# Patient Record
Sex: MALE | Race: WHITE | NOT HISPANIC OR LATINO | Employment: FULL TIME | URBAN - METROPOLITAN AREA
[De-identification: names, ages, dates, MRNs, and addresses within clinical notes are randomized per-mention and may not be internally consistent; named-entity substitution may affect disease eponyms.]

---

## 2021-05-07 ENCOUNTER — HOSPITAL ENCOUNTER (OUTPATIENT)
Dept: URGENT CARE | Facility: CLINIC | Age: 41
Discharge: HOME | End: 2021-05-07
Attending: PHYSICIAN ASSISTANT

## 2023-01-09 ENCOUNTER — HOSPITAL ENCOUNTER (EMERGENCY)
Facility: HOSPITAL | Age: 43
Discharge: HOME | End: 2023-01-09
Attending: EMERGENCY MEDICINE | Admitting: EMERGENCY MEDICINE
Payer: COMMERCIAL

## 2023-01-09 ENCOUNTER — TELEPHONE (OUTPATIENT)
Dept: GASTROENTEROLOGY | Facility: CLINIC | Age: 43
End: 2023-01-09

## 2023-01-09 ENCOUNTER — PREP FOR SURGERY (OUTPATIENT)
Dept: OTHER | Facility: HOSPITAL | Age: 43
End: 2023-01-09

## 2023-01-09 ENCOUNTER — APPOINTMENT (OUTPATIENT)
Dept: CT IMAGING | Facility: HOSPITAL | Age: 43
End: 2023-01-09
Payer: COMMERCIAL

## 2023-01-09 VITALS
DIASTOLIC BLOOD PRESSURE: 110 MMHG | HEART RATE: 65 BPM | WEIGHT: 216.93 LBS | RESPIRATION RATE: 18 BRPM | HEIGHT: 69 IN | OXYGEN SATURATION: 99 % | TEMPERATURE: 98.4 F | SYSTOLIC BLOOD PRESSURE: 140 MMHG | BODY MASS INDEX: 32.13 KG/M2

## 2023-01-09 DIAGNOSIS — I10 HYPERTENSION, UNCONTROLLED: ICD-10-CM

## 2023-01-09 DIAGNOSIS — R10.13 EPIGASTRIC PAIN: Primary | ICD-10-CM

## 2023-01-09 DIAGNOSIS — R10.13 ABDOMINAL PAIN, ACUTE, EPIGASTRIC: ICD-10-CM

## 2023-01-09 DIAGNOSIS — K62.5 RECTAL BLEEDING: ICD-10-CM

## 2023-01-09 DIAGNOSIS — K29.00 ACUTE SUPERFICIAL GASTRITIS WITHOUT HEMORRHAGE: Primary | ICD-10-CM

## 2023-01-09 LAB
ALBUMIN SERPL-MCNC: 4.5 G/DL (ref 3.5–5.2)
ALBUMIN/GLOB SERPL: 1.8 G/DL
ALP SERPL-CCNC: 66 U/L (ref 39–117)
ALT SERPL W P-5'-P-CCNC: 36 U/L (ref 1–41)
ANION GAP SERPL CALCULATED.3IONS-SCNC: 10.4 MMOL/L (ref 5–15)
AST SERPL-CCNC: 24 U/L (ref 1–40)
BASOPHILS # BLD AUTO: 0.04 10*3/MM3 (ref 0–0.2)
BASOPHILS NFR BLD AUTO: 0.6 % (ref 0–1.5)
BILIRUB SERPL-MCNC: <0.2 MG/DL (ref 0–1.2)
BILIRUB UR QL STRIP: NEGATIVE
BUN SERPL-MCNC: 10 MG/DL (ref 6–20)
BUN/CREAT SERPL: 11.2 (ref 7–25)
CALCIUM SPEC-SCNC: 9.2 MG/DL (ref 8.6–10.5)
CHLORIDE SERPL-SCNC: 103 MMOL/L (ref 98–107)
CLARITY UR: CLEAR
CO2 SERPL-SCNC: 24.6 MMOL/L (ref 22–29)
COLOR UR: YELLOW
CREAT SERPL-MCNC: 0.89 MG/DL (ref 0.76–1.27)
DEPRECATED RDW RBC AUTO: 43.3 FL (ref 37–54)
EGFRCR SERPLBLD CKD-EPI 2021: 110.4 ML/MIN/1.73
EOSINOPHIL # BLD AUTO: 0.36 10*3/MM3 (ref 0–0.4)
EOSINOPHIL NFR BLD AUTO: 5.3 % (ref 0.3–6.2)
ERYTHROCYTE [DISTWIDTH] IN BLOOD BY AUTOMATED COUNT: 13.1 % (ref 12.3–15.4)
GLOBULIN UR ELPH-MCNC: 2.5 GM/DL
GLUCOSE SERPL-MCNC: 92 MG/DL (ref 65–99)
GLUCOSE UR STRIP-MCNC: NEGATIVE MG/DL
HCT VFR BLD AUTO: 39 % (ref 37.5–51)
HEMOCCULT STL QL IA: POSITIVE
HGB BLD-MCNC: 13.2 G/DL (ref 13–17.7)
HGB UR QL STRIP.AUTO: NEGATIVE
HOLD SPECIMEN: NORMAL
HOLD SPECIMEN: NORMAL
IMM GRANULOCYTES # BLD AUTO: 0.05 10*3/MM3 (ref 0–0.05)
IMM GRANULOCYTES NFR BLD AUTO: 0.7 % (ref 0–0.5)
INR PPP: 0.98 (ref 0.86–1.15)
KETONES UR QL STRIP: NEGATIVE
LEUKOCYTE ESTERASE UR QL STRIP.AUTO: NEGATIVE
LIPASE SERPL-CCNC: 44 U/L (ref 13–60)
LYMPHOCYTES # BLD AUTO: 2.19 10*3/MM3 (ref 0.7–3.1)
LYMPHOCYTES NFR BLD AUTO: 32.2 % (ref 19.6–45.3)
MCH RBC QN AUTO: 30.6 PG (ref 26.6–33)
MCHC RBC AUTO-ENTMCNC: 33.8 G/DL (ref 31.5–35.7)
MCV RBC AUTO: 90.5 FL (ref 79–97)
MONOCYTES # BLD AUTO: 0.46 10*3/MM3 (ref 0.1–0.9)
MONOCYTES NFR BLD AUTO: 6.8 % (ref 5–12)
NEUTROPHILS NFR BLD AUTO: 3.7 10*3/MM3 (ref 1.7–7)
NEUTROPHILS NFR BLD AUTO: 54.4 % (ref 42.7–76)
NITRITE UR QL STRIP: NEGATIVE
NRBC BLD AUTO-RTO: 0 /100 WBC (ref 0–0.2)
PH UR STRIP.AUTO: 7.5 [PH] (ref 5–8)
PLATELET # BLD AUTO: 279 10*3/MM3 (ref 140–450)
PMV BLD AUTO: 9.3 FL (ref 6–12)
POTASSIUM SERPL-SCNC: 4.2 MMOL/L (ref 3.5–5.2)
PROT SERPL-MCNC: 7 G/DL (ref 6–8.5)
PROT UR QL STRIP: NEGATIVE
PROTHROMBIN TIME: 13 SECONDS (ref 11.8–14.9)
RBC # BLD AUTO: 4.31 10*6/MM3 (ref 4.14–5.8)
SODIUM SERPL-SCNC: 138 MMOL/L (ref 136–145)
SP GR UR STRIP: >1.03 (ref 1–1.03)
UROBILINOGEN UR QL STRIP: ABNORMAL
WBC NRBC COR # BLD: 6.8 10*3/MM3 (ref 3.4–10.8)
WHOLE BLOOD HOLD COAG: NORMAL
WHOLE BLOOD HOLD SPECIMEN: NORMAL

## 2023-01-09 PROCEDURE — 85610 PROTHROMBIN TIME: CPT | Performed by: EMERGENCY MEDICINE

## 2023-01-09 PROCEDURE — 74177 CT ABD & PELVIS W/CONTRAST: CPT

## 2023-01-09 PROCEDURE — 81003 URINALYSIS AUTO W/O SCOPE: CPT | Performed by: EMERGENCY MEDICINE

## 2023-01-09 PROCEDURE — 96375 TX/PRO/DX INJ NEW DRUG ADDON: CPT

## 2023-01-09 PROCEDURE — 99283 EMERGENCY DEPT VISIT LOW MDM: CPT

## 2023-01-09 PROCEDURE — 0 IOPAMIDOL PER 1 ML: Performed by: EMERGENCY MEDICINE

## 2023-01-09 PROCEDURE — 96374 THER/PROPH/DIAG INJ IV PUSH: CPT

## 2023-01-09 PROCEDURE — 85025 COMPLETE CBC W/AUTO DIFF WBC: CPT

## 2023-01-09 PROCEDURE — 80053 COMPREHEN METABOLIC PANEL: CPT | Performed by: EMERGENCY MEDICINE

## 2023-01-09 PROCEDURE — 83690 ASSAY OF LIPASE: CPT | Performed by: EMERGENCY MEDICINE

## 2023-01-09 PROCEDURE — 25010000002 ONDANSETRON PER 1 MG: Performed by: EMERGENCY MEDICINE

## 2023-01-09 PROCEDURE — 82274 ASSAY TEST FOR BLOOD FECAL: CPT | Performed by: EMERGENCY MEDICINE

## 2023-01-09 RX ORDER — LISINOPRIL 20 MG/1
20 TABLET ORAL DAILY
Qty: 30 TABLET | Refills: 2 | Status: SHIPPED | OUTPATIENT
Start: 2023-01-09 | End: 2023-02-21 | Stop reason: SDUPTHER

## 2023-01-09 RX ORDER — PANTOPRAZOLE SODIUM 40 MG/1
40 TABLET, DELAYED RELEASE ORAL DAILY
Qty: 30 TABLET | Refills: 0 | Status: SHIPPED | OUTPATIENT
Start: 2023-01-09 | End: 2023-02-21 | Stop reason: SDUPTHER

## 2023-01-09 RX ORDER — ONDANSETRON 2 MG/ML
4 INJECTION INTRAMUSCULAR; INTRAVENOUS ONCE
Status: COMPLETED | OUTPATIENT
Start: 2023-01-09 | End: 2023-01-09

## 2023-01-09 RX ORDER — SODIUM CHLORIDE 0.9 % (FLUSH) 0.9 %
10 SYRINGE (ML) INJECTION AS NEEDED
Status: DISCONTINUED | OUTPATIENT
Start: 2023-01-09 | End: 2023-01-09 | Stop reason: HOSPADM

## 2023-01-09 RX ORDER — ALUMINA, MAGNESIA, AND SIMETHICONE 2400; 2400; 240 MG/30ML; MG/30ML; MG/30ML
15 SUSPENSION ORAL ONCE
Status: COMPLETED | OUTPATIENT
Start: 2023-01-09 | End: 2023-01-09

## 2023-01-09 RX ORDER — LIDOCAINE HYDROCHLORIDE 20 MG/ML
15 SOLUTION OROPHARYNGEAL ONCE
Status: COMPLETED | OUTPATIENT
Start: 2023-01-09 | End: 2023-01-09

## 2023-01-09 RX ORDER — PANTOPRAZOLE SODIUM 40 MG/10ML
40 INJECTION, POWDER, LYOPHILIZED, FOR SOLUTION INTRAVENOUS ONCE
Status: COMPLETED | OUTPATIENT
Start: 2023-01-09 | End: 2023-01-09

## 2023-01-09 RX ADMIN — LIDOCAINE HYDROCHLORIDE 15 ML: 20 SOLUTION ORAL at 09:51

## 2023-01-09 RX ADMIN — IOPAMIDOL 100 ML: 755 INJECTION, SOLUTION INTRAVENOUS at 09:10

## 2023-01-09 RX ADMIN — PANTOPRAZOLE SODIUM 40 MG: 40 INJECTION, POWDER, FOR SOLUTION INTRAVENOUS at 09:51

## 2023-01-09 RX ADMIN — ONDANSETRON 4 MG: 2 INJECTION INTRAMUSCULAR; INTRAVENOUS at 09:50

## 2023-01-09 RX ADMIN — ALUMINUM HYDROXIDE, MAGNESIUM HYDROXIDE, AND DIMETHICONE 15 ML: 400; 400; 40 SUSPENSION ORAL at 09:51

## 2023-01-09 NOTE — TELEPHONE ENCOUNTER
Pt reports being in the ER on 01/09/2023 and states that the physician told him to contact our office to schedule an appt with Dr Casey. Please advise on scheduling.

## 2023-01-09 NOTE — ED PROVIDER NOTES
"Time: 8:46 AM EST  Date of encounter:  1/9/2023  Independent Historian/Clinical History and Information was obtained by:   Patient  Chief Complaint: Abdominal Pain    History is limited by: N/A    History of Present Illness:      Patient is a 42 y.o. year old male who presents to the emergency department for evaluation of epigastric abdominal pain. Pt reports bright red blood in stool. Pt reports that all symptoms have lasted for a couple months. Pt reports being put on lisinopril(40mg). Pt reports losing job and being unable to get his BP medication. Pt reports taking his mother's lisinopril medication. Pt states that he has so much blood in his stool that \"(he) could soak through a pad.\" Pt denies rectal itching or burning. Pt reports not seeing a doctor in 18 years until 6 months ago. Pt reports taking a high amount of ibuprofen because of headaches and he stopped taking it after being prescribed with lisinopril. Pt denies alcohol use. Pt reports being unable to keep food down unless he smokes marijuana. Pt reports a family history of GI issues. Pt reports abdominal pain when picking up weights.      History provided by:  Patient   used: No        Patient Care Team  Primary Care Provider: Provider, No Known    Past Medical History:     No Known Allergies  Past Medical History:   Diagnosis Date   • Hypertension    • Injury of back      Past Surgical History:   Procedure Laterality Date   • WRIST SURGERY Left 2012     History reviewed. No pertinent family history.    Home Medications:  Prior to Admission medications    Not on File        Social History:   Social History     Tobacco Use   • Smoking status: Every Day     Packs/day: 0.50     Types: Cigarettes   Substance Use Topics   • Alcohol use: Not Currently   • Drug use: Yes     Types: Marijuana         Review of Systems:  Review of Systems   Constitutional: Negative for chills and fever.   HENT: Negative for congestion, ear pain and sore " "throat.    Eyes: Negative for pain.   Respiratory: Negative for cough, chest tightness and shortness of breath.    Cardiovascular: Negative for chest pain.   Gastrointestinal: Positive for abdominal pain and blood in stool. Negative for diarrhea, nausea and vomiting.   Genitourinary: Negative for flank pain and hematuria.   Musculoskeletal: Negative for joint swelling.   Skin: Negative for pallor.   Neurological: Negative for seizures and headaches.   All other systems reviewed and are negative.       Physical Exam:  BP (!) 140/110   Pulse 65   Temp 98.4 °F (36.9 °C) (Oral)   Resp 18   Ht 175.3 cm (69\")   Wt 98.4 kg (216 lb 14.9 oz)   SpO2 99%   BMI 32.04 kg/m²     Physical Exam  Vitals and nursing note reviewed.   Constitutional:       General: He is not in acute distress.     Appearance: Normal appearance. He is not toxic-appearing.   HENT:      Head: Normocephalic and atraumatic.      Mouth/Throat:      Mouth: Mucous membranes are moist.   Eyes:      General: No scleral icterus.  Cardiovascular:      Rate and Rhythm: Normal rate and regular rhythm.      Pulses: Normal pulses.      Heart sounds: Normal heart sounds.   Pulmonary:      Effort: Pulmonary effort is normal. No respiratory distress.      Breath sounds: Normal breath sounds.   Abdominal:      General: Abdomen is flat.      Palpations: Abdomen is soft.      Tenderness: There is abdominal tenderness (mild) in the right lower quadrant and left lower quadrant.   Musculoskeletal:         General: Normal range of motion.      Cervical back: Normal range of motion and neck supple.   Skin:     General: Skin is warm and dry.   Neurological:      Mental Status: He is alert and oriented to person, place, and time. Mental status is at baseline.                  Procedures:  Procedures      Medical Decision Making:      Comorbidities that affect care:    Hypertension    External Notes reviewed:    Previous Admission Note      The following orders were placed " and all results were independently analyzed by me:  Orders Placed This Encounter   Procedures   • CT Abdomen Pelvis With Contrast   • [place] Draw   • Comprehensive Metabolic Panel   • Lipase   • Urinalysis With Microscopic If Indicated (No Culture) - Urine, Clean Catch   • CBC Auto Differential   • Protime-INR   • Occult Blood, Fecal By Immunoassay - Stool, Per Rectum   • Undress & Gown   • CBC & Differential   • Green Top (Gel)   • Lavender Top   • Gold Top - SST   • Light Blue Top       Medications Given in the Emergency Department:  Medications   ondansetron (ZOFRAN) injection 4 mg (4 mg Intravenous Given 1/9/23 0950)   pantoprazole (PROTONIX) injection 40 mg (40 mg Intravenous Given 1/9/23 0951)   aluminum-magnesium hydroxide-simethicone (MAALOX MAX) 400-400-40 MG/5ML suspension 15 mL (15 mL Oral Given 1/9/23 0951)   Lidocaine Viscous HCl (XYLOCAINE) 2 % solution 15 mL (15 mL Mouth/Throat Given 1/9/23 0951)   iopamidol (ISOVUE-370) 76 % injection 100 mL (100 mL Intravenous Given 1/9/23 0910)        ED Course:         Labs:    Lab Results (last 24 hours)     Procedure Component Value Units Date/Time    CBC & Differential [[id number]]  (Abnormal) Collected: 01/09/23 0833    Specimen: Blood Updated: 01/09/23 0841    Narrative:      The following orders were created for panel order CBC & Differential.  Procedure                               Abnormality         Status                     ---------                               -----------         ------                     CBC Auto Differential[[id number]]        Abnormal            Final result                 Please view results for these tests on the individual orders.    Comprehensive Metabolic Panel [[id number]] Collected: 01/09/23 0833    Specimen: Blood Updated: 01/09/23 0905     Glucose 92 mg/dL      BUN 10 mg/dL      Creatinine 0.89 mg/dL      Sodium 138 mmol/L      Potassium 4.2 mmol/L      Comment: Slight hemolysis detected by analyzer. Results may be  affected.        Chloride 103 mmol/L      CO2 24.6 mmol/L      Calcium 9.2 mg/dL      Total Protein 7.0 g/dL      Albumin 4.5 g/dL      ALT (SGPT) 36 U/L      AST (SGOT) 24 U/L      Comment: Slight hemolysis detected by analyzer. Results may be affected.        Alkaline Phosphatase 66 U/L      Total Bilirubin <0.2 mg/dL      Globulin 2.5 gm/dL      A/G Ratio 1.8 g/dL      BUN/Creatinine Ratio 11.2     Anion Gap 10.4 mmol/L      eGFR 110.4 mL/min/1.73      Comment: National Kidney Foundation and American Society of Nephrology (ASN) Task Force recommended calculation based on the Chronic Kidney Disease Epidemiology Collaboration (CKD-EPI) equation refit without adjustment for race.       Narrative:      GFR Normal >60  Chronic Kidney Disease <60  Kidney Failure <15      Lipase [[id number]]  (Normal) Collected: 01/09/23 0833    Specimen: Blood Updated: 01/09/23 0903     Lipase 44 U/L     CBC Auto Differential [[id number]]  (Abnormal) Collected: 01/09/23 0833    Specimen: Blood Updated: 01/09/23 0841     WBC 6.80 10*3/mm3      RBC 4.31 10*6/mm3      Hemoglobin 13.2 g/dL      Hematocrit 39.0 %      MCV 90.5 fL      MCH 30.6 pg      MCHC 33.8 g/dL      RDW 13.1 %      RDW-SD 43.3 fl      MPV 9.3 fL      Platelets 279 10*3/mm3      Neutrophil % 54.4 %      Lymphocyte % 32.2 %      Monocyte % 6.8 %      Eosinophil % 5.3 %      Basophil % 0.6 %      Immature Grans % 0.7 %      Neutrophils, Absolute 3.70 10*3/mm3      Lymphocytes, Absolute 2.19 10*3/mm3      Monocytes, Absolute 0.46 10*3/mm3      Eosinophils, Absolute 0.36 10*3/mm3      Basophils, Absolute 0.04 10*3/mm3      Immature Grans, Absolute 0.05 10*3/mm3      nRBC 0.0 /100 WBC     Protime-INR [[id number]]  (Normal) Collected: 01/09/23 0833    Specimen: Blood Updated: 01/09/23 0900     Protime 13.0 Seconds      INR 0.98    Narrative:      Suggested Therapeutic Ranges For Oral Anticoagulant Therapy:  Level of Therapy                      INR Target Range  Standard Dose                             2.0-3.0  High Dose                                2.5-3.5  Patients not receiving anticoagulant  Therapy Normal Range                     0.86-1.15    Urinalysis With Microscopic If Indicated (No Culture) - Urine, Clean Catch [[id number]]  (Abnormal) Collected: 01/09/23 0921    Specimen: Urine, Clean Catch Updated: 01/09/23 0932     Color, UA Yellow     Appearance, UA Clear     pH, UA 7.5     Specific Gravity, UA >1.030     Glucose, UA Negative     Ketones, UA Negative     Bilirubin, UA Negative     Blood, UA Negative     Protein, UA Negative     Leuk Esterase, UA Negative     Nitrite, UA Negative     Urobilinogen, UA 0.2 E.U./dL    Narrative:      Urine microscopic not indicated.    Occult Blood, Fecal By Immunoassay - Stool, Per Rectum [[id number]]  (Abnormal) Collected: 01/09/23 0950    Specimen: Stool from Per Rectum Updated: 01/09/23 1108     Occult Blood, Fecal by Immunoassay Positive           Imaging:    CT Abdomen Pelvis With Contrast    Result Date: 1/9/2023  PROCEDURE: CT ABDOMEN PELVIS W CONTRAST  COMPARISON: None  INDICATIONS: Epigastric abdominal pain with rectal bleeding, eval peptic ulcer disease  TECHNIQUE: After obtaining the patient's consent, CT images were created with non-ionic intravenous contrast material.   PROTOCOL:   Standard imaging protocol performed    RADIATION:   DLP: 1164.9mGy*cm   Automated exposure control was utilized to minimize radiation dose. CONTRAST: 100cc Isovue 370 I.V.  FINDINGS:  The lung bases are clear.  Distal esophagus is unremarkable.  The heart size is normal.  Superficial soft tissues are unremarkable.  There is no acute osseous abnormality or destructive bone lesion.  No significant osseous degenerative changes.  There is a small hemangioma in the inferior right hepatic lobe measuring up to 15 mm.  The gallbladder, bile ducts, pancreas, spleen, stomach and duodenum appear unremarkable.  The adrenal glands, kidneys, urinary bladder, prostate  and ureters appear within normal limits.  There is no small bowel distention.  The appendix is normal.  The colon is unremarkable.  No ascites, pneumoperitoneum or lymphadenopathy.  There is mild aortoiliac atherosclerosis.         1. No acute abdominal or pelvic abnormality. 2. Mild atherosclerotic disease. 3. Small hemangioma within the liver.     ESTEBAN PATEL MD       Electronically Signed and Approved By: ESTEBAN PATEL MD on 1/09/2023 at 9:22                 Differential Diagnosis and Discussion:    Abdominal Pain: Based on the patient's signs and symptoms, I considered abdominal aortic aneurysm, small bowel obstruction, pancreatitis, acute cholecystitis, acute appendecitis, peptic ulcer disease, gastritis, colitis, endocrine disorders, irritable bowel syndrome and other differential diagnosis an etiology of the patient's abdominal pain.    All labs were reviewed and analyzed by me.  CT scan radiology interpretation was reviewed by me.    MDM         This patient is a pleasant 42-year-old male who has had a 1 month history of upper abdominal pains mostly postprandial in nature and vomiting, but also having bright red blood per rectum the last few weeks.    His vital signs look unremarkable, other than uncontrolled hypertension, and he states he has run out of his prescribed lisinopril.    I checked some blood work today and his hemoglobin level is still normal at 13.2, although he is Hemoccult positive here.    CT imaging was performed given his abdominal pain, but it came back negative for acute findings.    I consider he could have some acute gastritis or peptic ulcer disease, as well as some hemorrhoids although we did not notice any on her exam.    I have consulted with our on-call gastroenterologist for urgent follow-up for the symptoms.    I will have him follow-up as an outpatient with gastroenterology for upper endoscopy and possibly colonoscopy for the symptoms, and I will start him on PPI medication  daily.    In addition I will call in refill on his lisinopril blood pressure meds          Patient Care Considerations:          Consultants/Shared Management Plan:    Consultant: I have discussed the case with Our on-call gastroenterology who states Patient can follow-up urgently as an outpatient to be assessed for possible need for endoscopy    Social Determinants of Health:    Patient is independent, reliable, and has access to care.       Disposition and Care Coordination:    Discharged: I considered escalation of care by admitting this patient for observation, however the patient has improved and is suitable and  stable for discharge.    I have explained the patient´s condition, diagnoses and treatment plan based on the information available to me at this time. I have answered questions and addressed any concerns. The patient has a good  understanding of the patient´s diagnosis, condition, and treatment plan as can be expected at this point. The vital signs have been stable. The patient´s condition is stable and appropriate for discharge from the emergency department.      The patient will pursue further outpatient evaluation with the primary care physician or other designated or consulting physician as outlined in the discharge instructions. They are agreeable to this plan of care and follow-up instructions have been explained in detail. The patient has received these instructions in written format and have expressed an understanding of the discharge instructions. The patient is aware that any significant change in condition or worsening of symptoms should prompt an immediate return to this or the closest emergency department or call to 911.  I have explained discharge medications and the need for follow up with the patient/caretakers. This was also printed in the discharge instructions. Patient was discharged with the following medications and follow up:      Medication List      New Prescriptions     lisinopril 20 MG tablet  Commonly known as: PRINIVIL,ZESTRIL  Take 1 tablet by mouth Daily.     pantoprazole 40 MG EC tablet  Commonly known as: PROTONIX  Take 1 tablet by mouth Daily.           Where to Get Your Medications      These medications were sent to [facility] [id number] - [place], [place] - [address] - [phone]  - [phone]  [address], [place] [address]    Phone: [phone]   · lisinopril 20 MG tablet  · pantoprazole 40 MG EC tablet      Clover Casey MD  [address]  [address]  [place] [address] [phone]    Call today  for a follow-up appointment       Final diagnoses:   Abdominal pain, acute, epigastric   Rectal bleeding   Acute superficial gastritis without hemorrhage   Hypertension, uncontrolled        ED Disposition     ED Disposition   Discharge    Condition   Stable    Comment   --             This medical record created using voice recognition software.        Documentation assistance provided by Noe Walsh, acting as scribe for Marquez Rosenthal MD. Information recorded by the scribe was done at my direction and has been verified and validated by me.       Noe Walsh  01/09/23 7282       Marquez Rosenthal MD  01/09/23 2464

## 2023-01-09 NOTE — DISCHARGE INSTRUCTIONS
Your blood work and CT scan of the abdomen looked okay today, but it sounds like you probably have some stomach lining irritation (gastritis) or even a stomach ulcer, and you will need to eventually need to undergo endoscopy (camera scope).    For now, avoid any spicy or greasy or fried foods, and take the Protonix acid reducer medicine every day until you follow-up with a gastroenterologist.    Also start taking the lisinopril 20 mg daily for your blood pressure.

## 2023-01-09 NOTE — Clinical Note
[facility] EMERGENCY ROOM  [address]  [place] [address]  Phone: [phone]    Yefri Correia was seen and treated in our emergency department on 1/9/2023.  He may return to work on 01/11/2023.         Thank you for choosing [facility].    Marquez Rosenthal MD

## 2023-01-10 PROBLEM — K62.5 RECTAL BLEEDING: Status: ACTIVE | Noted: 2023-01-10

## 2023-01-10 PROBLEM — R10.13 EPIGASTRIC PAIN: Status: ACTIVE | Noted: 2023-01-10

## 2023-01-10 NOTE — TELEPHONE ENCOUNTER
Pt aware of procedure. Pt given instructions.   Pt states that he has a pending insurance that he believes will cover procedure costs.

## 2023-01-12 ENCOUNTER — ANESTHESIA EVENT (OUTPATIENT)
Dept: GASTROENTEROLOGY | Facility: HOSPITAL | Age: 43
End: 2023-01-12
Payer: COMMERCIAL

## 2023-01-12 ENCOUNTER — ANESTHESIA (OUTPATIENT)
Dept: GASTROENTEROLOGY | Facility: HOSPITAL | Age: 43
End: 2023-01-12
Payer: COMMERCIAL

## 2023-01-12 ENCOUNTER — HOSPITAL ENCOUNTER (OUTPATIENT)
Facility: HOSPITAL | Age: 43
Setting detail: HOSPITAL OUTPATIENT SURGERY
Discharge: HOME | End: 2023-01-12
Attending: INTERNAL MEDICINE | Admitting: INTERNAL MEDICINE
Payer: COMMERCIAL

## 2023-01-12 VITALS
SYSTOLIC BLOOD PRESSURE: 122 MMHG | RESPIRATION RATE: 22 BRPM | BODY MASS INDEX: 31.91 KG/M2 | DIASTOLIC BLOOD PRESSURE: 102 MMHG | TEMPERATURE: 97.7 F | WEIGHT: 216.05 LBS | OXYGEN SATURATION: 99 % | HEART RATE: 82 BPM

## 2023-01-12 DIAGNOSIS — K62.5 RECTAL BLEEDING: ICD-10-CM

## 2023-01-12 DIAGNOSIS — R10.13 EPIGASTRIC PAIN: ICD-10-CM

## 2023-01-12 PROCEDURE — 88305 TISSUE EXAM BY PATHOLOGIST: CPT | Performed by: INTERNAL MEDICINE

## 2023-01-12 PROCEDURE — 45385 COLONOSCOPY W/LESION REMOVAL: CPT | Performed by: INTERNAL MEDICINE

## 2023-01-12 PROCEDURE — 43239 EGD BIOPSY SINGLE/MULTIPLE: CPT | Performed by: INTERNAL MEDICINE

## 2023-01-12 PROCEDURE — 25010000002 PROPOFOL 10 MG/ML EMULSION: Performed by: NURSE ANESTHETIST, CERTIFIED REGISTERED

## 2023-01-12 PROCEDURE — 45380 COLONOSCOPY AND BIOPSY: CPT | Performed by: INTERNAL MEDICINE

## 2023-01-12 RX ORDER — LIDOCAINE HYDROCHLORIDE 20 MG/ML
INJECTION, SOLUTION EPIDURAL; INFILTRATION; INTRACAUDAL; PERINEURAL AS NEEDED
Status: DISCONTINUED | OUTPATIENT
Start: 2023-01-12 | End: 2023-01-12 | Stop reason: SURG

## 2023-01-12 RX ORDER — PROPOFOL 10 MG/ML
VIAL (ML) INTRAVENOUS AS NEEDED
Status: DISCONTINUED | OUTPATIENT
Start: 2023-01-12 | End: 2023-01-12 | Stop reason: SURG

## 2023-01-12 RX ORDER — SODIUM CHLORIDE, SODIUM LACTATE, POTASSIUM CHLORIDE, CALCIUM CHLORIDE 600; 310; 30; 20 MG/100ML; MG/100ML; MG/100ML; MG/100ML
1000 INJECTION, SOLUTION INTRAVENOUS CONTINUOUS
Status: DISCONTINUED | OUTPATIENT
Start: 2023-01-12 | End: 2023-01-12 | Stop reason: HOSPADM

## 2023-01-12 RX ORDER — SODIUM CHLORIDE, SODIUM LACTATE, POTASSIUM CHLORIDE, CALCIUM CHLORIDE 600; 310; 30; 20 MG/100ML; MG/100ML; MG/100ML; MG/100ML
30 INJECTION, SOLUTION INTRAVENOUS CONTINUOUS
Status: DISCONTINUED | OUTPATIENT
Start: 2023-01-12 | End: 2023-01-12 | Stop reason: HOSPADM

## 2023-01-12 RX ADMIN — PROPOFOL 50 MG: 10 INJECTION, EMULSION INTRAVENOUS at 07:36

## 2023-01-12 RX ADMIN — PROPOFOL 100 MG: 10 INJECTION, EMULSION INTRAVENOUS at 07:34

## 2023-01-12 RX ADMIN — PROPOFOL 40 MG: 10 INJECTION, EMULSION INTRAVENOUS at 07:40

## 2023-01-12 RX ADMIN — LIDOCAINE HYDROCHLORIDE 50 MG: 20 INJECTION, SOLUTION EPIDURAL; INFILTRATION; INTRACAUDAL; PERINEURAL at 07:51

## 2023-01-12 RX ADMIN — SODIUM CHLORIDE, POTASSIUM CHLORIDE, SODIUM LACTATE AND CALCIUM CHLORIDE 1000 ML: 600; 310; 30; 20 INJECTION, SOLUTION INTRAVENOUS at 06:56

## 2023-01-12 RX ADMIN — LIDOCAINE HYDROCHLORIDE 50 MG: 20 INJECTION, SOLUTION EPIDURAL; INFILTRATION; INTRACAUDAL; PERINEURAL at 07:34

## 2023-01-12 RX ADMIN — PROPOFOL 200 MCG/KG/MIN: 10 INJECTION, EMULSION INTRAVENOUS at 07:34

## 2023-01-12 NOTE — H&P
Pre Procedure History & Physical    Chief Complaint:   Generalized abd pain, nausea, vomiting, rectal bleeding    Subjective     HPI:   43 yo M here for eval of generalized abd pain, nausea, vomiting, rectal bleeding.    Past Medical History:   Past Medical History:   Diagnosis Date   • GERD (gastroesophageal reflux disease)    • Hypertension    • Injury of back        Past Surgical History:  Past Surgical History:   Procedure Laterality Date   • WRIST SURGERY Left 2012       Family History:  Family History   Problem Relation Age of Onset   • Malig Hyperthermia Neg Hx        Social History:   reports that he has been smoking cigarettes. He has been smoking an average of .5 packs per day. He does not have any smokeless tobacco history on file. He reports that he does not currently use alcohol. He reports current drug use. Drug: Marijuana.    Medications:   Medications Prior to Admission   Medication Sig Dispense Refill Last Dose   • lisinopril (PRINIVIL,ZESTRIL) 20 MG tablet Take 1 tablet by mouth Daily. 30 tablet 2 Past Week   • pantoprazole (PROTONIX) 40 MG EC tablet Take 1 tablet by mouth Daily. 30 tablet 0 Past Week       Allergies:  Patient has no known allergies.    ROS:    Pertinent items are noted in HPI     Objective     Blood pressure (!) 136/108, pulse 82, temperature 97.9 °F (36.6 °C), temperature source Temporal, resp. rate 20, weight 98 kg (216 lb 0.8 oz), SpO2 97 %.    Physical Exam   Constitutional: Pt is oriented to person, place, and time and well-developed, well-nourished, and in no distress.   Mouth/Throat: Oropharynx is clear and moist.   Neck: Normal range of motion.   Cardiovascular: Normal rate, regular rhythm and normal heart sounds.    Pulmonary/Chest: Effort normal and breath sounds normal.   Abdominal: Soft. Nontender  Skin: Skin is warm and dry.   Psychiatric: Mood, memory, affect and judgment normal.     Assessment & Plan     Diagnosis:  Generalized abd pain, nausea, vomiting, rectal  bleeding    Anticipated Surgical Procedure:  EGD/colonoscopy    The risks, benefits, and alternatives of this procedure have been discussed with the patient or the responsible party- the patient understands and agrees to proceed.

## 2023-01-12 NOTE — ANESTHESIA PREPROCEDURE EVALUATION
Anesthesia Evaluation     Patient summary reviewed and Nursing notes reviewed   no history of anesthetic complications:  NPO Solid Status: > 8 hours  NPO Liquid Status: > 2 hours           Airway   Mallampati: I  TM distance: >3 FB  Neck ROM: full  No difficulty expected  Dental      Pulmonary - negative pulmonary ROS and normal exam    breath sounds clear to auscultation  Cardiovascular - normal exam  Exercise tolerance: good (4-7 METS)    Rhythm: regular  Rate: normal    (+) hypertension,       Neuro/Psych- negative ROS  GI/Hepatic/Renal/Endo    (+)  GERD, GI bleeding ,     Musculoskeletal (-) negative ROS    Abdominal    Substance History - negative use     OB/GYN negative ob/gyn ROS         Other - negative ROS       ROS/Med Hx Other: PAT Nursing Notes unavailable.                   Anesthesia Plan    ASA 2     general     (Patient understands anesthesia not responsible for dental damage.)  intravenous induction     Anesthetic plan, risks, benefits, and alternatives have been provided, discussed and informed consent has been obtained with: patient.    Use of blood products discussed with patient .   Plan discussed with CRNA.        CODE STATUS:

## 2023-01-12 NOTE — ANESTHESIA POSTPROCEDURE EVALUATION
Patient: Yefri Correia Jr.    Procedure Summary     Date: 01/12/23 Room / Location: [facility] ENDOSCOPY 2 / [facility] ENDOSCOPY    Anesthesia Start: 0731 Anesthesia Stop: 0800    Procedures:       ESOPHAGOGASTRODUODENOSCOPY with biopsies      COLONOSCOPY  with random colon biopsies, polypectomy with cold snare Diagnosis:       Epigastric pain      Rectal bleeding      (Epigastric pain [R10.13])      (Rectal bleeding [K62.5])    Surgeons: Clover Casey MD Provider: Luis Daniel Clark MD    Anesthesia Type: general ASA Status: 2          Anesthesia Type: general    Vitals  Vitals Value Taken Time   /102 01/12/23 0818   Temp 36.5 °C (97.7 °F) 01/12/23 0818   Pulse 82 01/12/23 0818   Resp 22 01/12/23 0818   SpO2 99 % 01/12/23 0818           Post Anesthesia Care and Evaluation    Patient location during evaluation: bedside  Patient participation: complete - patient participated  Level of consciousness: awake  Pain management: adequate    Airway patency: patent  Anesthetic complications: No anesthetic complications  PONV Status: none  Cardiovascular status: acceptable and stable  Respiratory status: acceptable  Hydration status: acceptable    Comments: An Anesthesiologist personally participated in the most demanding procedures (including induction and emergence if applicable) in the anesthesia plan, monitored the course of anesthesia administration at frequent intervals and remained physically present and available for immediate diagnosis and treatment of emergencies.

## 2023-01-13 ENCOUNTER — PREP FOR SURGERY (OUTPATIENT)
Dept: OTHER | Facility: HOSPITAL | Age: 43
End: 2023-01-13
Payer: COMMERCIAL

## 2023-01-13 ENCOUNTER — TELEPHONE (OUTPATIENT)
Dept: GASTROENTEROLOGY | Facility: CLINIC | Age: 43
End: 2023-01-13
Payer: COMMERCIAL

## 2023-01-13 LAB
CYTO UR: NORMAL
LAB AP CASE REPORT: NORMAL
LAB AP CLINICAL INFORMATION: NORMAL
PATH REPORT.FINAL DX SPEC: NORMAL
PATH REPORT.GROSS SPEC: NORMAL

## 2023-01-13 NOTE — TELEPHONE ENCOUNTER
----- Message from Clover Casey MD sent at 1/13/2023  8:43 AM EST -----  Recall colonoscopy in 5 years.  Normal random colon biopsies.    Recall EGD in 1 year for Cagle's esophagus.  No H.pylori.  No signs of Celiac disease.    Please arrange f/u appointment with NP.  
Spoke to pt and informed of Dr. Casey's result note and recommendations. Educated pt on Cagle's esophagus. Confirmed pt taking PPI, Protonix. F/u apt scheduled on 6/29/2023. Pt verified understanding.     1 year EGD recall placed.  5 year Colon recall placed.  
SARS pneumonia

## 2023-02-21 ENCOUNTER — OFFICE VISIT (OUTPATIENT)
Dept: FAMILY MEDICINE CLINIC | Facility: CLINIC | Age: 43
End: 2023-02-21
Payer: COMMERCIAL

## 2023-02-21 VITALS
BODY MASS INDEX: 32.88 KG/M2 | HEART RATE: 105 BPM | WEIGHT: 222 LBS | DIASTOLIC BLOOD PRESSURE: 100 MMHG | OXYGEN SATURATION: 98 % | TEMPERATURE: 97.5 F | HEIGHT: 69 IN | SYSTOLIC BLOOD PRESSURE: 158 MMHG

## 2023-02-21 DIAGNOSIS — K21.9 GASTROESOPHAGEAL REFLUX DISEASE, UNSPECIFIED WHETHER ESOPHAGITIS PRESENT: ICD-10-CM

## 2023-02-21 DIAGNOSIS — I10 HYPERTENSION, UNSPECIFIED TYPE: Primary | ICD-10-CM

## 2023-02-21 DIAGNOSIS — Z11.59 NEED FOR HEPATITIS C SCREENING TEST: ICD-10-CM

## 2023-02-21 LAB
CHOLEST SERPL-MCNC: 222 MG/DL (ref 0–200)
HCV AB SER DONR QL: NORMAL
HDLC SERPL-MCNC: 36 MG/DL (ref 40–60)
LDLC SERPL CALC-MCNC: 147 MG/DL (ref 0–100)
LDLC/HDLC SERPL: 3.99 {RATIO}
TRIGL SERPL-MCNC: 211 MG/DL (ref 0–150)
TSH SERPL DL<=0.05 MIU/L-ACNC: 2.33 UIU/ML (ref 0.27–4.2)
VLDLC SERPL-MCNC: 39 MG/DL (ref 5–40)

## 2023-02-21 PROCEDURE — 84443 ASSAY THYROID STIM HORMONE: CPT | Performed by: NURSE PRACTITIONER

## 2023-02-21 PROCEDURE — 99203 OFFICE O/P NEW LOW 30 MIN: CPT | Performed by: NURSE PRACTITIONER

## 2023-02-21 PROCEDURE — 86803 HEPATITIS C AB TEST: CPT | Performed by: NURSE PRACTITIONER

## 2023-02-21 PROCEDURE — 36415 COLL VENOUS BLD VENIPUNCTURE: CPT | Performed by: NURSE PRACTITIONER

## 2023-02-21 PROCEDURE — 80061 LIPID PANEL: CPT | Performed by: NURSE PRACTITIONER

## 2023-02-21 RX ORDER — LISINOPRIL 20 MG/1
20 TABLET ORAL DAILY
Qty: 90 TABLET | Refills: 0 | Status: SHIPPED | OUTPATIENT
Start: 2023-02-21

## 2023-02-21 RX ORDER — PANTOPRAZOLE SODIUM 40 MG/1
40 TABLET, DELAYED RELEASE ORAL DAILY
Qty: 90 TABLET | Refills: 0 | Status: SHIPPED | OUTPATIENT
Start: 2023-02-21

## 2023-02-21 NOTE — PROGRESS NOTES
Venipuncture Blood Specimen Collection  Venipuncture performed in left arm by Nerissa Naranjo with good hemostasis. Patient tolerated the procedure well without complications.   02/21/23   Nerissa Naranjo

## 2023-02-21 NOTE — PROGRESS NOTES
Chief Complaint  Hypertension (Out of b/p medication, needs refills) and Heartburn (refill)    PHQ-2 Total Score: 1    Subjective        Past Medical History:   Diagnosis Date   • GERD (gastroesophageal reflux disease)    • Hypertension    • Injury of back      Social History     Tobacco Use   • Smoking status: Former     Packs/day: 0.50     Types: Cigarettes     Start date:      Quit date: 2022     Years since quittin.6     Passive exposure: Current   • Smokeless tobacco: Former     Types: Snuff   Vaping Use   • Vaping Use: Former   • Substances: Nicotine, Flavoring   • Devices: Disposable   Substance Use Topics   • Alcohol use: Not Currently   • Drug use: Yes     Types: Marijuana     Comment: currently      Current Outpatient Medications on File Prior to Visit   Medication Sig   • [DISCONTINUED] lisinopril (PRINIVIL,ZESTRIL) 20 MG tablet Take 1 tablet by mouth Daily.   • [DISCONTINUED] pantoprazole (PROTONIX) 40 MG EC tablet Take 1 tablet by mouth Daily.   • NON FORMULARY / PATIENT SUPPLIED MEDICATION Nifedipine 0.2% with lidocaine 2% topical four times daily x 8 weeks, dispense 30 grams, no refills.     No current facility-administered medications on file prior to visit.      No Known Allergies   Health Maintenance Due   Topic Date Due   • COVID-19 Vaccine (1) Never done   • Pneumococcal Vaccine 0-64 (1 - PCV) Never done   • HEPATITIS C SCREENING  Never done   • ANNUAL PHYSICAL  Never done      Yefri Correia Jr. presents to [facility]  History of Present Illness  Here as a new patient to establish care.    HTN: Pt notes he will get chest pain and headache with high blood pressure. Notes mild headache today. Pt has been taking his father's lisinopril 10mg tablets. States has been normal with 20mg in the past. Works maintenance. Pt eats very little. Pt states he will get a migraine that are debilitating.     GERD: pt notes frequent vomiting for years after  "eating without a known trigger. He has been seeing Gastroenterology. No hx of choking with eating. Pt states his gallbladder has been normal. Pt states in the past he has been able to avoid vomiting with eating by having Protonix.  Patient states he will not have vomiting after eating with use of marijuana.    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  The patient admits to marijuana use.      Objective   Vital Signs:   /100 (BP Location: Left arm)   Pulse 105   Temp 97.5 °F (36.4 °C)   Ht 175.3 cm (69\")   Wt 101 kg (222 lb)   SpO2 98%   BMI 32.78 kg/m²     Review of Systems   Respiratory: Negative for shortness of breath and wheezing.    Cardiovascular: Positive for chest pain. Negative for palpitations.   Neurological: Positive for headache. Negative for dizziness and light-headedness.      Physical Exam  Vitals reviewed.   Constitutional:       General: He is not in acute distress.     Appearance: Normal appearance. He is well-developed.   HENT:      Head: Normocephalic and atraumatic.   Eyes:      Conjunctiva/sclera: Conjunctivae normal.      Pupils: Pupils are equal, round, and reactive to light.   Cardiovascular:      Rate and Rhythm: Normal rate and regular rhythm.      Heart sounds: Normal heart sounds.   Pulmonary:      Effort: Pulmonary effort is normal.      Breath sounds: Normal breath sounds.   Musculoskeletal:      Cervical back: Neck supple.   Skin:     General: Skin is warm and dry.   Neurological:      Mental Status: He is alert and oriented to person, place, and time.   Psychiatric:         Mood and Affect: Mood and affect normal.         Behavior: Behavior normal.         Thought Content: Thought content normal.         Judgment: Judgment normal.        Result Review :   The following data was reviewed by: JULIA Bradford on 02/21/2023:  CMP    CMP 1/9/23   Glucose 92   BUN 10   Creatinine 0.89   eGFR " 110.4   Sodium 138   Potassium 4.2   Chloride 103   Calcium 9.2   Total Protein 7.0   Albumin 4.5   Globulin 2.5   Total Bilirubin <0.2   Alkaline Phosphatase 66   AST (SGOT) 24   ALT (SGPT) 36   Albumin/Globulin Ratio 1.8   BUN/Creatinine Ratio 11.2   Anion Gap 10.4      Comments are available for some flowsheets but are not being displayed.           CBC    CBC 1/9/23   WBC 6.80   RBC 4.31   Hemoglobin 13.2   Hematocrit 39.0   MCV 90.5   MCH 30.6   MCHC 33.8   RDW 13.1   Platelets 279           Data reviewed: GI studies colonoscopy & EGD          Assessment and Plan    Diagnoses and all orders for this visit:    1. Hypertension, unspecified type (Primary)  -     lisinopril (PRINIVIL,ZESTRIL) 20 MG tablet; Take 1 tablet by mouth Daily.  Dispense: 90 tablet; Refill: 0  -     TSH Rfx On Abnormal To Free T4  -     Lipid Panel    2. Gastroesophageal reflux disease, unspecified whether esophagitis present  -     pantoprazole (PROTONIX) 40 MG EC tablet; Take 1 tablet by mouth Daily.  Dispense: 90 tablet; Refill: 0    3. Need for hepatitis C screening test  -     Hepatitis C Antibody      Will refill patient's lisinopril 20 mg.  Patient to follow-up with continued elevated blood pressure.    Refill Protonix, patient to follow-up with gastro regarding postprandial vomiting especially if no improvement with use of Protonix.    Labs pending.    Follow Up   Return in about 3 months (around 5/21/2023) for Refills and fasting labs.  Patient was given instructions and counseling regarding his condition or for health maintenance advice. Please see specific information pulled into the AVS if appropriate.

## 2023-05-22 DIAGNOSIS — K21.9 GASTROESOPHAGEAL REFLUX DISEASE, UNSPECIFIED WHETHER ESOPHAGITIS PRESENT: ICD-10-CM

## 2023-05-22 RX ORDER — PANTOPRAZOLE SODIUM 40 MG/1
TABLET, DELAYED RELEASE ORAL
Qty: 90 TABLET | Refills: 0 | Status: SHIPPED | OUTPATIENT
Start: 2023-05-22

## 2023-05-30 ENCOUNTER — HOSPITAL ENCOUNTER (EMERGENCY)
Facility: HOSPITAL | Age: 43
Discharge: HOME | End: 2023-05-30
Attending: EMERGENCY MEDICINE | Admitting: EMERGENCY MEDICINE

## 2023-05-30 ENCOUNTER — APPOINTMENT (OUTPATIENT)
Dept: GENERAL RADIOLOGY | Facility: HOSPITAL | Age: 43
End: 2023-05-30

## 2023-05-30 VITALS
DIASTOLIC BLOOD PRESSURE: 93 MMHG | HEART RATE: 89 BPM | SYSTOLIC BLOOD PRESSURE: 130 MMHG | BODY MASS INDEX: 32.14 KG/M2 | OXYGEN SATURATION: 94 % | HEIGHT: 69 IN | WEIGHT: 217 LBS | TEMPERATURE: 98 F | RESPIRATION RATE: 18 BRPM

## 2023-05-30 DIAGNOSIS — I10 HYPERTENSION, UNSPECIFIED TYPE: ICD-10-CM

## 2023-05-30 DIAGNOSIS — I10 UNCONTROLLED HYPERTENSION: Primary | ICD-10-CM

## 2023-05-30 LAB
ALBUMIN SERPL-MCNC: 4.5 G/DL (ref 3.5–5.2)
ALBUMIN/GLOB SERPL: 1.6 G/DL
ALP SERPL-CCNC: 57 U/L (ref 39–117)
ALT SERPL W P-5'-P-CCNC: 20 U/L (ref 1–41)
ANION GAP SERPL CALCULATED.3IONS-SCNC: 9.5 MMOL/L (ref 5–15)
AST SERPL-CCNC: 24 U/L (ref 1–40)
BASOPHILS # BLD AUTO: 0.04 10*3/MM3 (ref 0–0.2)
BASOPHILS NFR BLD AUTO: 0.6 % (ref 0–1.5)
BILIRUB SERPL-MCNC: 0.2 MG/DL (ref 0–1.2)
BUN SERPL-MCNC: 7 MG/DL (ref 6–20)
BUN/CREAT SERPL: 8.6 (ref 7–25)
CALCIUM SPEC-SCNC: 9.2 MG/DL (ref 8.6–10.5)
CHLORIDE SERPL-SCNC: 104 MMOL/L (ref 98–107)
CO2 SERPL-SCNC: 25.5 MMOL/L (ref 22–29)
CREAT SERPL-MCNC: 0.81 MG/DL (ref 0.76–1.27)
DEPRECATED RDW RBC AUTO: 39.9 FL (ref 37–54)
EGFRCR SERPLBLD CKD-EPI 2021: 113.6 ML/MIN/1.73
EOSINOPHIL # BLD AUTO: 0.42 10*3/MM3 (ref 0–0.4)
EOSINOPHIL NFR BLD AUTO: 6.6 % (ref 0.3–6.2)
ERYTHROCYTE [DISTWIDTH] IN BLOOD BY AUTOMATED COUNT: 12.8 % (ref 12.3–15.4)
GLOBULIN UR ELPH-MCNC: 2.9 GM/DL
GLUCOSE SERPL-MCNC: 87 MG/DL (ref 65–99)
HCT VFR BLD AUTO: 36.8 % (ref 37.5–51)
HGB BLD-MCNC: 13 G/DL (ref 13–17.7)
HOLD SPECIMEN: NORMAL
HOLD SPECIMEN: NORMAL
IMM GRANULOCYTES # BLD AUTO: 0.02 10*3/MM3 (ref 0–0.05)
IMM GRANULOCYTES NFR BLD AUTO: 0.3 % (ref 0–0.5)
LIPASE SERPL-CCNC: 27 U/L (ref 13–60)
LYMPHOCYTES # BLD AUTO: 1.78 10*3/MM3 (ref 0.7–3.1)
LYMPHOCYTES NFR BLD AUTO: 28.1 % (ref 19.6–45.3)
MAGNESIUM SERPL-MCNC: 2.1 MG/DL (ref 1.6–2.6)
MCH RBC QN AUTO: 30.5 PG (ref 26.6–33)
MCHC RBC AUTO-ENTMCNC: 35.3 G/DL (ref 31.5–35.7)
MCV RBC AUTO: 86.4 FL (ref 79–97)
MONOCYTES # BLD AUTO: 0.48 10*3/MM3 (ref 0.1–0.9)
MONOCYTES NFR BLD AUTO: 7.6 % (ref 5–12)
NEUTROPHILS NFR BLD AUTO: 3.59 10*3/MM3 (ref 1.7–7)
NEUTROPHILS NFR BLD AUTO: 56.8 % (ref 42.7–76)
NRBC BLD AUTO-RTO: 0 /100 WBC (ref 0–0.2)
NT-PROBNP SERPL-MCNC: 39 PG/ML (ref 0–450)
PLATELET # BLD AUTO: 310 10*3/MM3 (ref 140–450)
PMV BLD AUTO: 9.6 FL (ref 6–12)
POTASSIUM SERPL-SCNC: 4.1 MMOL/L (ref 3.5–5.2)
PROT SERPL-MCNC: 7.4 G/DL (ref 6–8.5)
RBC # BLD AUTO: 4.26 10*6/MM3 (ref 4.14–5.8)
SODIUM SERPL-SCNC: 139 MMOL/L (ref 136–145)
TROPONIN T SERPL HS-MCNC: <6 NG/L
WBC NRBC COR # BLD: 6.33 10*3/MM3 (ref 3.4–10.8)
WHOLE BLOOD HOLD COAG: NORMAL
WHOLE BLOOD HOLD SPECIMEN: NORMAL

## 2023-05-30 PROCEDURE — 85025 COMPLETE CBC W/AUTO DIFF WBC: CPT

## 2023-05-30 PROCEDURE — 83690 ASSAY OF LIPASE: CPT

## 2023-05-30 PROCEDURE — 83735 ASSAY OF MAGNESIUM: CPT

## 2023-05-30 PROCEDURE — 93005 ELECTROCARDIOGRAM TRACING: CPT

## 2023-05-30 PROCEDURE — 99284 EMERGENCY DEPT VISIT MOD MDM: CPT

## 2023-05-30 PROCEDURE — 84484 ASSAY OF TROPONIN QUANT: CPT

## 2023-05-30 PROCEDURE — 80053 COMPREHEN METABOLIC PANEL: CPT

## 2023-05-30 PROCEDURE — 71045 X-RAY EXAM CHEST 1 VIEW: CPT

## 2023-05-30 PROCEDURE — 83880 ASSAY OF NATRIURETIC PEPTIDE: CPT

## 2023-05-30 RX ORDER — LISINOPRIL 20 MG/1
20 TABLET ORAL DAILY
Qty: 90 TABLET | Refills: 0 | Status: SHIPPED | OUTPATIENT
Start: 2023-05-30

## 2023-05-30 RX ORDER — SODIUM CHLORIDE 0.9 % (FLUSH) 0.9 %
10 SYRINGE (ML) INJECTION AS NEEDED
Status: DISCONTINUED | OUTPATIENT
Start: 2023-05-30 | End: 2023-05-30

## 2023-05-30 RX ORDER — ASPIRIN 81 MG/1
324 TABLET, CHEWABLE ORAL ONCE
Status: DISCONTINUED | OUTPATIENT
Start: 2023-05-30 | End: 2023-05-30 | Stop reason: HOSPADM

## 2023-05-30 RX ORDER — SODIUM CHLORIDE 0.9 % (FLUSH) 0.9 %
10 SYRINGE (ML) INJECTION AS NEEDED
Status: DISCONTINUED | OUTPATIENT
Start: 2023-05-30 | End: 2023-05-30 | Stop reason: HOSPADM

## 2023-05-30 NOTE — Clinical Note
[facility] EMERGENCY ROOM  [address]  [place] [address]  Phone: [phone]    Yefri Correia was seen and treated in our emergency department on 5/30/2023.  He may return to work on 05/31/2023.         Thank you for choosing [facility].    Imer Bowling MD

## 2023-05-30 NOTE — ED PROVIDER NOTES
Time: 9:57 AM EDT  Date of encounter:  5/30/2023  Independent Historian/Clinical History and Information was obtained by:   Patient  Chief Complaint: Chest pain, difficulty breathing    History is limited by: N/A    History of Present Illness:  Patient is a 42 y.o. year old male who presents to the emergency department for evaluation of chest heaviness and difficulty breathing starting this morning on waking.  Patient denies GERD symptoms.  Complains of chest pressure and shortness of breath that has now resolved in the emergency department.  Wonders if it is related to uncontrolled blood pressure as he has been out of his lisinopril for the past 3 days.  No vomiting or diarrhea.  No fever or cough.    HPI    Patient Care Team  Primary Care Provider: Megan Alas APRN    Past Medical History:     No Known Allergies  Past Medical History:   Diagnosis Date   • GERD (gastroesophageal reflux disease)    • Hypertension    • Injury of back      Past Surgical History:   Procedure Laterality Date   • COLONOSCOPY N/A 1/12/2023    Procedure: COLONOSCOPY  with random colon biopsies, polypectomy with cold snare;  Surgeon: Clover Casey MD;  Location: [facility] ENDOSCOPY;  Service: Gastroenterology;  Laterality: N/A;  colon polyps   • ENDOSCOPY N/A 1/12/2023    Procedure: ESOPHAGOGASTRODUODENOSCOPY with biopsies;  Surgeon: Clover Casey MD;  Location: [facility] ENDOSCOPY;  Service: Gastroenterology;  Laterality: N/A;  irregular z-line    • WRIST SURGERY Left 2012     Family History   Problem Relation Age of Onset   • Arthritis Mother    • Thyroid disease Mother    • Hypertension Mother    • Diabetes Mother    • Arthritis Father    • COPD Father    • Stroke Father    • Heart disease Father    • Malig Hyperthermia Neg Hx        Home Medications:  Prior to Admission medications    Medication Sig Start Date End Date Taking? Authorizing Provider   lisinopril (PRINIVIL,ZESTRIL) 20 MG tablet Take 1 tablet by mouth  "Daily. 23   Megan Alas APRN   NON FORMULARY / PATIENT SUPPLIED MEDICATION Nifedipine 0.2% with lidocaine 2% topical four times daily x 8 weeks, dispense 30 grams, no refills. 23   Clover Casey MD   pantoprazole (PROTONIX) 40 MG EC tablet TAKE ONE TABLET BY MOUTH DAILY 23   Megan Alas APRN        Social History:   Social History     Tobacco Use   • Smoking status: Every Day     Packs/day: 0.50     Types: Cigarettes     Start date:      Last attempt to quit: 2022     Years since quittin.9     Passive exposure: Current   • Smokeless tobacco: Former     Types: Snuff   Vaping Use   • Vaping Use: Former   • Substances: Nicotine, Flavoring   • Devices: Disposable   Substance Use Topics   • Alcohol use: Not Currently   • Drug use: Yes     Types: Marijuana     Comment: currently         Review of Systems:  Review of Systems   Constitutional: Negative for chills and fever.   HENT: Negative for congestion, rhinorrhea and sore throat.    Eyes: Negative for photophobia.   Respiratory: Positive for chest tightness and shortness of breath. Negative for apnea and cough.    Cardiovascular: Negative for chest pain and palpitations.   Gastrointestinal: Negative for abdominal pain, diarrhea, nausea and vomiting.   Endocrine: Negative.    Genitourinary: Negative for difficulty urinating and dysuria.   Musculoskeletal: Negative for back pain, joint swelling and myalgias.   Skin: Negative for color change and wound.   Allergic/Immunologic: Negative.    Neurological: Negative for seizures and headaches.   Psychiatric/Behavioral: Negative.    All other systems reviewed and are negative.       Physical Exam:  /93   Pulse 89   Temp 98 °F (36.7 °C) (Oral)   Resp 18   Ht 175.3 cm (69\")   Wt 98.4 kg (217 lb)   SpO2 94%   BMI 32.05 kg/m²     Physical Exam  Vitals and nursing note reviewed.   Constitutional:       Appearance: Normal appearance.   HENT:      Head: Normocephalic and " atraumatic.      Nose: Nose normal.      Mouth/Throat:      Mouth: Mucous membranes are dry.   Eyes:      Extraocular Movements: Extraocular movements intact.      Pupils: Pupils are equal, round, and reactive to light.   Cardiovascular:      Rate and Rhythm: Normal rate and regular rhythm.      Heart sounds: Normal heart sounds.   Pulmonary:      Effort: Pulmonary effort is normal.      Breath sounds: Normal breath sounds.   Abdominal:      General: Bowel sounds are normal.      Palpations: Abdomen is soft.      Tenderness: There is no abdominal tenderness.   Musculoskeletal:         General: No swelling. Normal range of motion.      Cervical back: Normal range of motion and neck supple.   Skin:     General: Skin is warm and dry.      Coloration: Skin is not jaundiced.   Neurological:      General: No focal deficit present.      Mental Status: He is alert and oriented to person, place, and time. Mental status is at baseline.   Psychiatric:         Mood and Affect: Mood normal.         Behavior: Behavior normal.         Judgment: Judgment normal.                  Procedures:  Procedures      Medical Decision Making:      Comorbidities that affect care:    Hypertension, GERD    External Notes reviewed:    Encounter review: Office visit 2/21/2023 with family medicine for hypertension.  Patient has requested a Protonix refill on 5/22/2023 with family medicine for GERD      The following orders were placed and all results were independently analyzed by me:  Orders Placed This Encounter   Procedures   • XR Chest 1 View   • [place] Draw   • Comprehensive Metabolic Panel   • Lipase   • BNP   • Magnesium   • Single High Sensitivity Troponin T   • CBC Auto Differential   • NPO Diet NPO Type: Strict NPO   • Undress & Gown   • Undress & Gown   • Continuous Pulse Oximetry   • Vital Signs   • Oxygen Therapy- Nasal Cannula; Titrate 1-6 LPM Per SpO2; 90 - 95%   • ECG 12 Lead ED Triage Standing Order; Chest Pain   • Insert  Peripheral IV   • Insert Peripheral IV   • CBC & Differential   • Green Top (Gel)   • Lavender Top   • Gold Top - SST   • Light Blue Top       Medications Given in the Emergency Department:  Medications   sodium chloride 0.9 % flush 10 mL (has no administration in time range)   aspirin chewable tablet 324 mg (324 mg Oral Not Given 5/30/23 0813)        ED Course:         Labs:    Lab Results (last 24 hours)     Procedure Component Value Units Date/Time    CBC & Differential [[id number]]  (Abnormal) Collected: 05/30/23 0819    Specimen: Blood Updated: 05/30/23 0922    Narrative:      The following orders were created for panel order CBC & Differential.  Procedure                               Abnormality         Status                     ---------                               -----------         ------                     CBC Auto Differential[[id number]]        Abnormal            Final result                 Please view results for these tests on the individual orders.    Comprehensive Metabolic Panel [[id number]] Collected: 05/30/23 0819    Specimen: Blood Updated: 05/30/23 0942     Glucose 87 mg/dL      BUN 7 mg/dL      Creatinine 0.81 mg/dL      Sodium 139 mmol/L      Potassium 4.1 mmol/L      Chloride 104 mmol/L      CO2 25.5 mmol/L      Calcium 9.2 mg/dL      Total Protein 7.4 g/dL      Albumin 4.5 g/dL      ALT (SGPT) 20 U/L      AST (SGOT) 24 U/L      Alkaline Phosphatase 57 U/L      Total Bilirubin 0.2 mg/dL      Globulin 2.9 gm/dL      A/G Ratio 1.6 g/dL      BUN/Creatinine Ratio 8.6     Anion Gap 9.5 mmol/L      eGFR 113.6 mL/min/1.73     Narrative:      GFR Normal >60  Chronic Kidney Disease <60  Kidney Failure <15      Lipase [[id number]]  (Normal) Collected: 05/30/23 0819    Specimen: Blood Updated: 05/30/23 0942     Lipase 27 U/L     BNP [[id number]]  (Normal) Collected: 05/30/23 0819    Specimen: Blood Updated: 05/30/23 0940     proBNP 39.0 pg/mL     Narrative:      Among patients with dyspnea, NT-proBNP  is highly sensitive for the detection of acute congestive heart failure. In addition NT-proBNP of <300 pg/ml effectively rules out acute congestive heart failure with 99% negative predictive value.      Magnesium [[id number]]  (Normal) Collected: 05/30/23 0819    Specimen: Blood Updated: 05/30/23 0942     Magnesium 2.1 mg/dL     Single High Sensitivity Troponin T [[id number]]  (Normal) Collected: 05/30/23 0819    Specimen: Blood Updated: 05/30/23 0942     HS Troponin T <6 ng/L     Narrative:      High Sensitive Troponin T Reference Range:  <10.0 ng/L- Negative Female for AMI  <15.0 ng/L- Negative Male for AMI  >=10 - Abnormal Female indicating possible myocardial injury.  >=15 - Abnormal Male indicating possible myocardial injury.   Clinicians would have to utilize clinical acumen, EKG, Troponin, and serial changes to determine if it is an Acute Myocardial Infarction or myocardial injury due to an underlying chronic condition.         CBC Auto Differential [[id number]]  (Abnormal) Collected: 05/30/23 0819    Specimen: Blood Updated: 05/30/23 0922     WBC 6.33 10*3/mm3      RBC 4.26 10*6/mm3      Hemoglobin 13.0 g/dL      Hematocrit 36.8 %      MCV 86.4 fL      MCH 30.5 pg      MCHC 35.3 g/dL      RDW 12.8 %      RDW-SD 39.9 fl      MPV 9.6 fL      Platelets 310 10*3/mm3      Neutrophil % 56.8 %      Lymphocyte % 28.1 %      Monocyte % 7.6 %      Eosinophil % 6.6 %      Basophil % 0.6 %      Immature Grans % 0.3 %      Neutrophils, Absolute 3.59 10*3/mm3      Lymphocytes, Absolute 1.78 10*3/mm3      Monocytes, Absolute 0.48 10*3/mm3      Eosinophils, Absolute 0.42 10*3/mm3      Basophils, Absolute 0.04 10*3/mm3      Immature Grans, Absolute 0.02 10*3/mm3      nRBC 0.0 /100 WBC            Imaging:    XR Chest 1 View    Result Date: 5/30/2023  PROCEDURE: XR CHEST 1 VW  COMPARISON: [facility], , CHEST AP/PA 1 VIEW, 6/06/2018, 22:37.  INDICATIONS: CHEST PAIN/HEAVINESS & SHORTNESS OF BREATH TODAY  FINDINGS:   The lungs are clear bilaterally.  The cardiac and mediastinal silhouettes appear normal.  No effusion is seen.        1. No acute cardiopulmonary disease.       William Caro M.D.       Electronically Signed and Approved By: William Caro M.D. on 5/30/2023 at 9:19                 Differential Diagnosis and Discussion:    Chest Pain:  Based on the patient's signs and symptoms, I considered aortic dissection, myocardial infaction, pulmonary embolism, cardiac tamponade, pericarditis, pneumothorax, musculoskeletal chest pain and other differential diagnosis as an etiology of the patient's chest pain.   Dyspnea: Differential diagnosis includes but is not limited to metabolic acidosis, neurological disorders, psychogenic, asthma, pneumothorax, upper airway obstruction, COPD, pneumonia, noncardiogenic pulmonary edema, interstitial lung disease, anemia, congestive heart failure, and pulmonary embolism    All labs were reviewed and interpreted by me.  All X-rays impressions were independently interpreted by me.  EKG was interpreted by me.    MDM         Patient Care Considerations:    CONSULT: I considered consulting Cardiology, however Cardiac work-up is negative      Consultants/Shared Management Plan:    None    Social Determinants of Health:    Patient is independent, reliable, and has access to care.       Disposition and Care Coordination:    Discharged: The patient is suitable and stable for discharge with no need for consideration of observation or admission.    I have explained the patient´s condition, diagnoses and treatment plan based on the information available to me at this time. I have answered questions and addressed any concerns. The patient has a good  understanding of the patient´s diagnosis, condition, and treatment plan as can be expected at this point. The vital signs have been stable. The patient´s condition is stable and appropriate for discharge from the emergency department.      The patient will  pursue further outpatient evaluation with the primary care physician or other designated or consulting physician as outlined in the discharge instructions. They are agreeable to this plan of care and follow-up instructions have been explained in detail. The patient has received these instructions in written format and have expressed an understanding of the discharge instructions. The patient is aware that any significant change in condition or worsening of symptoms should prompt an immediate return to this or the closest emergency department or call to 911.    Final diagnoses:   Uncontrolled hypertension        ED Disposition     ED Disposition   Discharge    Condition   Stable    Comment   --             This medical record created using voice recognition software.           Imer Bowling MD  05/30/23 1029

## 2023-06-03 LAB
QT INTERVAL: 364 MS
QT INTERVAL: 395 MS

## 2025-06-16 ENCOUNTER — TELEPHONE (OUTPATIENT)
Dept: GASTROENTEROLOGY | Facility: CLINIC | Age: 45
End: 2025-06-16
Payer: COMMERCIAL

## 2025-06-16 NOTE — TELEPHONE ENCOUNTER
Hub staff attempted to follow warm transfer process and was unsuccessful     Caller: Yefri Correia Jr.    Relationship to patient: Self    Best call back number:  [phone]    Patient is needing: PT IS HAVING ABDOMINA PAIN, CRAPS LASTING UP TO 12 HOURS AFTER EATING, VOMITING AND BLOOD IN STOOLS. PLEASE CALL AND ADVISE.

## 2025-06-17 ENCOUNTER — OFFICE VISIT (OUTPATIENT)
Dept: GASTROENTEROLOGY | Facility: CLINIC | Age: 45
End: 2025-06-17
Payer: COMMERCIAL

## 2025-06-17 VITALS
SYSTOLIC BLOOD PRESSURE: 133 MMHG | DIASTOLIC BLOOD PRESSURE: 86 MMHG | BODY MASS INDEX: 31.58 KG/M2 | HEIGHT: 69 IN | WEIGHT: 213.2 LBS | HEART RATE: 91 BPM

## 2025-06-17 DIAGNOSIS — K22.70 BARRETT'S ESOPHAGUS WITHOUT DYSPLASIA: ICD-10-CM

## 2025-06-17 DIAGNOSIS — Z86.0100 HISTORY OF COLON POLYPS: ICD-10-CM

## 2025-06-17 DIAGNOSIS — K64.9 HEMORRHOIDS, UNSPECIFIED HEMORRHOID TYPE: ICD-10-CM

## 2025-06-17 DIAGNOSIS — K21.00 GASTROESOPHAGEAL REFLUX DISEASE WITH ESOPHAGITIS WITHOUT HEMORRHAGE: Primary | ICD-10-CM

## 2025-06-17 DIAGNOSIS — K62.5 RECTAL BLEEDING: ICD-10-CM

## 2025-06-17 PROCEDURE — 99214 OFFICE O/P EST MOD 30 MIN: CPT | Performed by: NURSE PRACTITIONER

## 2025-06-17 RX ORDER — ESOMEPRAZOLE MAGNESIUM 40 MG/1
40 CAPSULE, DELAYED RELEASE ORAL
Qty: 30 CAPSULE | Refills: 5 | Status: SHIPPED | OUTPATIENT
Start: 2025-06-17

## 2025-06-17 NOTE — PROGRESS NOTES
Chief Complaint   Abdominal Pain (After eating/)    History of Present Illness       Yefri Correia Jr. is a 44 y.o. male who presents to [facility] GASTR[facility]ENTEROLOGY for follow-up         History of Present Illness  The patient is a 44-year-old male who presents today for an office follow-up. He underwent EGD and colonoscopy with Dr. Grant on 01/12/2023. The EGD was performed for generalized abdominal pain with nausea and vomiting, revealing an irregular Z-line and a normal stomach. The colonoscopy was conducted due to rectal bleeding and abdominal pain, showing an anal fissure on perianal exam, external and internal hemorrhoids, and colon polyps, which were removed. Stomach biopsies indicated reflux esophagitis with Cagle's esophagus without dysplasia. A repeat EGD is scheduled for 01/2024 to confirm Cagle's esophagus, and a recall colonoscopy is due in 01/2028.    He reports experiencing frequent vomiting episodes, which he suspects may be related to his Cagle's esophagus. He also describes a significant decrease in his appetite, often consuming only what would be considered an appetizer by most standards. Despite this, he experiences frequent bowel movements, with the second often being predominantly blood. He recalls an incident where he consumed a chili cheese coney dog, after which he experienced severe stomach burning during a bowel movement, lasting for 12 consecutive hours, accompanied by persistent gagging. This has led to a fear of eating anything. He also notes that certain foods, such as vegetable soup, cause his stomach to swell. He is not currently taking any blood thinners. He has previously tried Nexium 40 mg but found it ineffective and discontinued its use.    He has experienced episodes of lightheadedness, dizziness, and blurred vision, particularly when standing, which have necessitated him to leave work on several occasions.    His blood pressure was  "elevated during his last visit. He was prescribed medication, which he took for a short period. However, he was unable to obtain a refill as his blood pressure had normalized. He believes that smoking marijuana helps maintain his blood pressure within normal limits.    SOCIAL HISTORY  Diet: The patient reports eating small meals, such as a \"chilly cheese, Coney dog\" and \"vegetable beef soup.\"  Recreational Drugs: The patient smokes weed.          Results       Result Review :                     Lipase   Lipase   Date Value Ref Range Status   05/30/2023 27 13 - 60 U/L Final     Amylase No results found for: \"AMYLASE\"  Iron Profile No results found for: \"IRON\", \"TIBC\", \"LABIRON\", \"TRANSFERRIN\"  Ferritin No results found for: \"FERRITIN\"  ESR (Sed Rate) No results found for: \"SEDRATE\"  CRP (C-Reactive) No results found for: \"CRP\"  Liver Workup   Protime   Date Value Ref Range Status   01/09/2023 13.0 11.8 - 14.9 Seconds Final     INR   Date Value Ref Range Status   01/09/2023 0.98 0.86 - 1.15 Final     Acute HEP Panel   Hepatitis C Ab   Date Value Ref Range Status   02/21/2023 Non-Reactive Non-Reactive Final     Alpha 1 No results found for: \"AFPTM\"  SEE No results found for: \"DSDNA\", \"EXPANDEDENA\"            Results  Diagnostic Testing   - EGD: 01/12/2023, Irregular Z-line. Normal stomach. Otherwise normal exam.   - Colonoscopy: 01/12/2023, Anal fissure on perianal exam. External hemorrhoids noted. Internal hemorrhoids noted. Colon polyps were removed.   - Stomach Biopsy: Reflux esophagitis with Cagle's esophagus without dysplasia.      Past Medical History       Past Medical History:   Diagnosis Date    GERD (gastroesophageal reflux disease)     Hypertension     Injury of back        Past Surgical History:   Procedure Laterality Date    COLONOSCOPY N/A 1/12/2023    Procedure: COLONOSCOPY  with random colon biopsies, polypectomy with cold snare;  Surgeon: Clover Casey MD;  Location: [facility] ENDOSCOPY;  " "Service: Gastroenterology;  Laterality: N/A;  colon polyps    ENDOSCOPY N/A 1/12/2023    Procedure: ESOPHAGOGASTRODUODENOSCOPY with biopsies;  Surgeon: Clover Casey MD;  Location: [facility] ENDOSCOPY;  Service: Gastroenterology;  Laterality: N/A;  irregular z-line     WRIST SURGERY Left 2012         Current Outpatient Medications:     esomeprazole (nexIUM) 40 MG capsule, Take 1 capsule by mouth Every Morning Before Breakfast., Disp: 30 capsule, Rfl: 5     No Known Allergies    Family History   Problem Relation Age of Onset    Arthritis Mother     Thyroid disease Mother     Hypertension Mother     Diabetes Mother     Arthritis Father     COPD Father     Stroke Father     Heart disease Father     Malig Hyperthermia Neg Hx         Social History     Social History Narrative    Not on file       Objective       Review of Systems   Constitutional:  Negative for appetite change, fatigue, fever, unexpected weight gain and unexpected weight loss.   HENT:  Negative for trouble swallowing.    Gastrointestinal:  Positive for abdominal distention, abdominal pain, anal bleeding, diarrhea, nausea, rectal pain, vomiting, GERD and indigestion. Negative for blood in stool and constipation.        Vital Signs:   /86 (BP Location: Left arm, Patient Position: Sitting, Cuff Size: Large Adult)   Pulse 91   Ht 175.3 cm (69\")   Wt 96.7 kg (213 lb 3.2 oz)   BMI 31.48 kg/m²       Physical Exam  Constitutional:       General: He is not in acute distress.     Appearance: He is well-developed. He is not ill-appearing.   HENT:      Head: Normocephalic.   Eyes:      Pupils: Pupils are equal, round, and reactive to light.   Cardiovascular:      Rate and Rhythm: Normal rate and regular rhythm.      Heart sounds: Normal heart sounds.   Pulmonary:      Effort: Pulmonary effort is normal.      Breath sounds: Normal breath sounds.   Abdominal:      General: Bowel sounds are normal. There is distension.      Palpations: Abdomen is " soft. There is no mass.      Tenderness: There is abdominal tenderness. There is no guarding or rebound.      Hernia: No hernia is present.   Musculoskeletal:         General: Normal range of motion.   Skin:     General: Skin is warm and dry.   Neurological:      Mental Status: He is alert and oriented to person, place, and time.   Psychiatric:         Speech: Speech normal.         Behavior: Behavior normal.         Judgment: Judgment normal.         Physical Exam  Rectal: Perianal exam revealed anal fissure, external hemorrhoids, and internal hemorrhoids.        Assessment & Plan          Assessment and Plan    Diagnoses and all orders for this visit:    1. Gastroesophageal reflux disease with esophagitis without hemorrhage (Primary)  -     Case Request; Standing  -     Follow Anesthesia Guidelines / Protocol; Future  -     Case Request  -     esomeprazole (nexIUM) 40 MG capsule; Take 1 capsule by mouth Every Morning Before Breakfast.  Dispense: 30 capsule; Refill: 5    2. Cagle's esophagus without dysplasia  -     Case Request; Standing  -     Follow Anesthesia Guidelines / Protocol; Future  -     Case Request    3. History of colon polyps    4. Rectal bleeding  -     Ambulatory Referral to General Surgery    5. Hemorrhoids, unspecified hemorrhoid type  -     Ambulatory Referral to General Surgery    Other orders  -     Follow Anesthesia Guidelines / Protocol; Standing  -     Obtain Informed Consent; Standing    The risk of the endoscopy were discussed in detail. Possible risks/complications, benefits, and alternatives to surgical or invasive procedure have been explained to patient and/or legal guardian; risks include bleeding, infection, and perforation. Patient has been evaluated and can tolerate anesthesia and/or sedation.     Assessment & Plan  1. Cagle's esophagus:  - Repeat EGD necessary to confirm the presence of Cagle's esophagus.  - Avoid fried, greasy, and spicy foods.  - Consume bland foods such  as mashed potatoes, rice, noodles, eggs, breads, sandwiches, and lunch meats.  - Avoid tomatoes and beans as they can aggravate the condition.  - Prescription for Nexium 40 mg provided and sent to pharmacy.    2.  Rectal bleeding most likely secondary internal and external hemorrhoids/anal fissure:  - Referral to Dr. Renner for potential banding of the hemorrhoids.  - Monitor for symptoms of anemia due to significant rectal bleeding.              Follow Up       Follow Up   Return for F/U AFTER PROCEDURE.  Patient was given instructions and counseling regarding his condition or for health maintenance advice. Please see specific information pulled into the AVS if appropriate.       Patient or patient representative verbalized consent for the use of Ambient Listening during the visit with  JULIA Holland for chart documentation. 6/17/2025  15:26 EDT

## 2025-06-19 ENCOUNTER — TELEPHONE (OUTPATIENT)
Dept: GASTROENTEROLOGY | Facility: CLINIC | Age: 45
End: 2025-06-19
Payer: COMMERCIAL

## 2025-06-19 NOTE — TELEPHONE ENCOUNTER
ENDO RECONCILIATION  Verify source of procedure(s): Office visit  If other, please list source: 6/17/25    TIME OUT-CONFIRM CORRECT PROCEDURE: EGD  Cardiology:  Pulmonology:  Blood thinner:   GLP-1:  Additional DX/indication for procedure:     Please include any other notes relevant to endo reconciliation: N/A

## 2025-06-19 NOTE — PAT
Arrival time of  1300 given.    Come to [facility] entrance, entrance C. Bring picture ID and insurance care. Have licensed  for transportation home after the procedure.    Nothing to eat or drink after midnight.     Hold all medications the morning of the procedure except nebulizers or inhalers.     Bring mediations and inhalers to hospital with you.     Plan to be here 3-4 hours. Do not bring valuables with you to hospital.     Pt verbalized understanding of instructions.

## 2025-06-24 ENCOUNTER — ANESTHESIA EVENT (OUTPATIENT)
Dept: GASTROENTEROLOGY | Facility: HOSPITAL | Age: 45
End: 2025-06-24
Payer: COMMERCIAL

## 2025-06-24 NOTE — ANESTHESIA PREPROCEDURE EVALUATION
Anesthesia Evaluation     Patient summary reviewed   NPO Solid Status: > 8 hours  NPO Liquid Status: > 8 hours           Airway   Mallampati: I  TM distance: >3 FB  Neck ROM: limited  No difficulty expected  Dental    (+) edentulous    Pulmonary     breath sounds clear to auscultation  (+) a smoker Abstained day of surgery,  Cardiovascular     ECG reviewed  Rhythm: regular  Rate: normal    (+) hypertension (NOT ON A HTN MED (DBP  today))      Neuro/Psych  GI/Hepatic/Renal/Endo    (+) obesity, GERD well controlled, GI bleeding resolved    Musculoskeletal     Abdominal    Substance History   (+) drug use (MARIJUANA (pt denies use today))     OB/GYN          Other        ROS/Med Hx Other:  NORMAL ECG -  Sinus rhythm  When compared with ECG of 30-May-2023 8:06:16,  No significant change  Electronically Signed By: Brian Rajan ([facility]) 03-Jun-2023 17:49:59  Date and Time of Study: 2023-05-30 10:02:14                    Anesthesia Plan    ASA 2     general   total IV anesthesia  (Total IV Anesthesia    Patient understands anesthesia not responsible for dental damage.    Discussed risks with pt including aspiration, allergic reactions, apnea, advanced airway placement. Pt verbalized understanding. All questions answered.   )  intravenous induction     Anesthetic plan, risks, benefits, and alternatives have been provided, discussed and informed consent has been obtained with: patient.    Plan discussed with CRNA.    CODE STATUS:

## 2025-06-25 ENCOUNTER — TELEPHONE (OUTPATIENT)
Dept: GASTROENTEROLOGY | Facility: CLINIC | Age: 45
End: 2025-06-25
Payer: COMMERCIAL

## 2025-06-25 ENCOUNTER — ANESTHESIA (OUTPATIENT)
Dept: GASTROENTEROLOGY | Facility: HOSPITAL | Age: 45
End: 2025-06-25
Payer: COMMERCIAL

## 2025-06-25 ENCOUNTER — HOSPITAL ENCOUNTER (OUTPATIENT)
Facility: HOSPITAL | Age: 45
Setting detail: HOSPITAL OUTPATIENT SURGERY
Discharge: HOME | End: 2025-06-25
Attending: INTERNAL MEDICINE | Admitting: INTERNAL MEDICINE
Payer: COMMERCIAL

## 2025-06-25 VITALS
OXYGEN SATURATION: 98 % | SYSTOLIC BLOOD PRESSURE: 129 MMHG | HEART RATE: 79 BPM | DIASTOLIC BLOOD PRESSURE: 105 MMHG | RESPIRATION RATE: 15 BRPM | TEMPERATURE: 98.2 F | WEIGHT: 205.03 LBS | BODY MASS INDEX: 30.28 KG/M2

## 2025-06-25 DIAGNOSIS — K21.00 GASTROESOPHAGEAL REFLUX DISEASE WITH ESOPHAGITIS WITHOUT HEMORRHAGE: ICD-10-CM

## 2025-06-25 DIAGNOSIS — R11.2 NAUSEA AND VOMITING, UNSPECIFIED VOMITING TYPE: Primary | ICD-10-CM

## 2025-06-25 DIAGNOSIS — K22.70 BARRETT'S ESOPHAGUS WITHOUT DYSPLASIA: ICD-10-CM

## 2025-06-25 PROCEDURE — 25010000002 PROPOFOL 10 MG/ML EMULSION: Performed by: NURSE ANESTHETIST, CERTIFIED REGISTERED

## 2025-06-25 PROCEDURE — 25010000002 LIDOCAINE PF 2% 2 % SOLUTION: Performed by: NURSE ANESTHETIST, CERTIFIED REGISTERED

## 2025-06-25 PROCEDURE — 88305 TISSUE EXAM BY PATHOLOGIST: CPT | Performed by: INTERNAL MEDICINE

## 2025-06-25 PROCEDURE — 25810000003 LACTATED RINGERS PER 1000 ML: Performed by: NURSE ANESTHETIST, CERTIFIED REGISTERED

## 2025-06-25 RX ORDER — PROPOFOL 10 MG/ML
VIAL (ML) INTRAVENOUS CONTINUOUS PRN
Status: DISCONTINUED | OUTPATIENT
Start: 2025-06-25 | End: 2025-06-25 | Stop reason: SURG

## 2025-06-25 RX ORDER — SODIUM CHLORIDE, SODIUM LACTATE, POTASSIUM CHLORIDE, CALCIUM CHLORIDE 600; 310; 30; 20 MG/100ML; MG/100ML; MG/100ML; MG/100ML
30 INJECTION, SOLUTION INTRAVENOUS CONTINUOUS
Status: DISCONTINUED | OUTPATIENT
Start: 2025-06-25 | End: 2025-06-25 | Stop reason: HOSPADM

## 2025-06-25 RX ORDER — LIDOCAINE HYDROCHLORIDE 20 MG/ML
INJECTION, SOLUTION EPIDURAL; INFILTRATION; INTRACAUDAL; PERINEURAL AS NEEDED
Status: DISCONTINUED | OUTPATIENT
Start: 2025-06-25 | End: 2025-06-25 | Stop reason: SURG

## 2025-06-25 RX ORDER — SODIUM CHLORIDE, SODIUM LACTATE, POTASSIUM CHLORIDE, CALCIUM CHLORIDE 600; 310; 30; 20 MG/100ML; MG/100ML; MG/100ML; MG/100ML
INJECTION, SOLUTION INTRAVENOUS CONTINUOUS PRN
Status: DISCONTINUED | OUTPATIENT
Start: 2025-06-25 | End: 2025-06-25 | Stop reason: SURG

## 2025-06-25 RX ADMIN — PROPOFOL 250 MCG/KG/MIN: 10 INJECTION, EMULSION INTRAVENOUS at 09:04

## 2025-06-25 RX ADMIN — SODIUM CHLORIDE, POTASSIUM CHLORIDE, SODIUM LACTATE AND CALCIUM CHLORIDE: 600; 310; 30; 20 INJECTION, SOLUTION INTRAVENOUS at 09:04

## 2025-06-25 RX ADMIN — LIDOCAINE HYDROCHLORIDE 80 MG: 20 INJECTION, SOLUTION INTRAVENOUS at 09:04

## 2025-06-25 NOTE — H&P
Pre Procedure History & Physical    Chief Complaint:   F/u of Cagle's esophagus    Subjective     HPI:   43 yo M here for f/u of Cagle's esophagus.    Past Medical History:   Past Medical History:   Diagnosis Date    GERD (gastroesophageal reflux disease)     Hypertension     Injury of back        Past Surgical History:  Past Surgical History:   Procedure Laterality Date    COLONOSCOPY N/A 1/12/2023    Procedure: COLONOSCOPY  with random colon biopsies, polypectomy with cold snare;  Surgeon: Clover Casey MD;  Location: [facility] ENDOSCOPY;  Service: Gastroenterology;  Laterality: N/A;  colon polyps    ENDOSCOPY N/A 1/12/2023    Procedure: ESOPHAGOGASTRODUODENOSCOPY with biopsies;  Surgeon: Clover Casey MD;  Location: [facility] ENDOSCOPY;  Service: Gastroenterology;  Laterality: N/A;  irregular z-line     WRIST SURGERY Left 2012       Family History:  Family History   Problem Relation Age of Onset    Arthritis Mother     Thyroid disease Mother     Hypertension Mother     Diabetes Mother     Arthritis Father     COPD Father     Stroke Father     Heart disease Father     Malig Hyperthermia Neg Hx        Social History:   reports that he has been smoking cigarettes. He started smoking about 32 years ago. He has a 14.7 pack-year smoking history. He has been exposed to tobacco smoke. He has quit using smokeless tobacco.  His smokeless tobacco use included snuff. He reports that he does not currently use alcohol. He reports current drug use. Drug: Marijuana.    Medications:   Medications Prior to Admission   Medication Sig Dispense Refill Last Dose/Taking    esomeprazole (nexIUM) 40 MG capsule Take 1 capsule by mouth Every Morning Before Breakfast. 30 capsule 5        Allergies:  Patient has no known allergies.    ROS:    Pertinent items are noted in HPI     Objective     Weight 93 kg (205 lb 0.4 oz).    Physical Exam   Constitutional: Pt is oriented to person, place, and time and well-developed,  well-nourished, and in no distress.   Mouth/Throat: Oropharynx is clear and moist.   Neck: Normal range of motion.   Cardiovascular: Normal rate, regular rhythm and normal heart sounds.    Pulmonary/Chest: Effort normal and breath sounds normal.   Abdominal: Soft. Nontender  Skin: Skin is warm and dry.   Psychiatric: Mood, memory, affect and judgment normal.     Assessment & Plan     Diagnosis:  F/u of Cagle's esophagus    Anticipated Surgical Procedure:  EGD    The risks, benefits, and alternatives of this procedure have been discussed with the patient or the responsible party- the patient understands and agrees to proceed.

## 2025-06-25 NOTE — ANESTHESIA POSTPROCEDURE EVALUATION
Patient: Yefri Correia Jr.    Procedure Summary       Date: 06/25/25 Room / Location: [facility] ENDOSCOPY 4 / [facility] ENDOSCOPY    Anesthesia Start: 0904 Anesthesia Stop: 0914    Procedure: ESOPHAGOGASTRODUODENOSCOPY with biopsies Diagnosis:       Gastroesophageal reflux disease with esophagitis without hemorrhage      Cagle's esophagus without dysplasia      (Gastroesophageal reflux disease with esophagitis without hemorrhage [K21.00])      (Cagle's esophagus without dysplasia [K22.70])    Surgeons: Clover Casey MD Provider: Binh Luna CRNA    Anesthesia Type: general ASA Status: 2            Anesthesia Type: general    Vitals  Vitals Value Taken Time   /105 06/25/25 09:31   Temp 36.8 °C (98.2 °F) 06/25/25 09:31   Pulse 79 06/25/25 09:31   Resp 15 06/25/25 09:31   SpO2 98 % 06/25/25 09:31           Post Anesthesia Care and Evaluation    Patient location during evaluation: bedside  Patient participation: complete - patient participated  Level of consciousness: awake  Pain management: adequate    Airway patency: patent  Anesthetic complications: No anesthetic complications  PONV Status: controlled  Cardiovascular status: acceptable and stable  Respiratory status: acceptable

## 2025-06-26 ENCOUNTER — RESULTS FOLLOW-UP (OUTPATIENT)
Dept: GASTROENTEROLOGY | Facility: HOSPITAL | Age: 45
End: 2025-06-26
Payer: COMMERCIAL

## 2025-06-26 NOTE — TELEPHONE ENCOUNTER
Pt reports persistent nausea, vomiting after meals.  Will order RUQ US to eval for gallstones.  Pt also advised to start PPI as previously prescribed.  He reports he has not started yet due to cost but will be able to start soon.

## 2025-07-07 ENCOUNTER — TELEPHONE (OUTPATIENT)
Dept: GASTROENTEROLOGY | Facility: CLINIC | Age: 45
End: 2025-07-07

## 2025-07-07 NOTE — TELEPHONE ENCOUNTER
Vandana from the [facility] called and requested to know if this patient's US on 7/10/2025 could be moved until they can get insurance approval.

## 2025-07-24 ENCOUNTER — TELEPHONE (OUTPATIENT)
Dept: GASTROENTEROLOGY | Facility: CLINIC | Age: 45
End: 2025-07-24

## 2025-07-24 NOTE — TELEPHONE ENCOUNTER
S/w pt in regards to ODR he states he lost his job and insurance, he states he will have to call back to reschedule his US.

## (undated) DEVICE — LINER SURG CANSTR SXN S/RIGD 1500CC

## (undated) DEVICE — CONN JET HYDRA H20 AUXILIARY DISP

## (undated) DEVICE — SINGLE-USE BIOPSY FORCEPS: Brand: RADIAL JAW 4

## (undated) DEVICE — SOLIDIFIER LIQLOC PLS 1500CC BT

## (undated) DEVICE — Device: Brand: DEFENDO AIR/WATER/SUCTION AND BIOPSY VALVE

## (undated) DEVICE — Device

## (undated) DEVICE — BLCK/BITE BLOX WO/DENTL/RIM W/STRAP 54F

## (undated) DEVICE — DEFENDO AIR WATER SUCTION AND BIOPSY VALVE KIT FOR  OLYMPUS: Brand: DEFENDO AIR/WATER/SUCTION AND BIOPSY VALVE

## (undated) DEVICE — SOL IRRG H2O PL/BG 1000ML STRL

## (undated) DEVICE — SNAR POLYP CAPTIFLEX XS/OVL 11X2.4MM 240CM 1P/U

## (undated) DEVICE — THE STERILE LIGHT HANDLE COVER IS USED WITH STERIS SURGICAL LIGHTING AND VISUALIZATION SYSTEMS.

## (undated) DEVICE — THE SINGLE USE ETRAP – POLYP TRAP IS USED FOR SUCTION RETRIEVAL OF ENDOSCOPICALLY REMOVED POLYPS.: Brand: ETRAP